# Patient Record
Sex: MALE | Race: WHITE | NOT HISPANIC OR LATINO | Employment: OTHER | URBAN - METROPOLITAN AREA
[De-identification: names, ages, dates, MRNs, and addresses within clinical notes are randomized per-mention and may not be internally consistent; named-entity substitution may affect disease eponyms.]

---

## 2024-10-23 ENCOUNTER — TELEPHONE (OUTPATIENT)
Dept: PRIMARY CARE | Facility: CLINIC | Age: 79
End: 2024-10-23
Payer: MEDICARE

## 2024-10-23 DIAGNOSIS — E78.49 OTHER HYPERLIPIDEMIA: ICD-10-CM

## 2024-10-23 DIAGNOSIS — E03.8 OTHER SPECIFIED HYPOTHYROIDISM: ICD-10-CM

## 2024-10-23 PROBLEM — E03.9 HYPOTHYROIDISM: Status: ACTIVE | Noted: 2020-02-19

## 2024-12-14 ENCOUNTER — ANCILLARY PROCEDURE (OUTPATIENT)
Dept: URGENT CARE | Age: 79
End: 2024-12-14
Payer: MEDICARE

## 2024-12-14 ENCOUNTER — OFFICE VISIT (OUTPATIENT)
Dept: URGENT CARE | Age: 79
End: 2024-12-14
Payer: MEDICARE

## 2024-12-14 VITALS
WEIGHT: 165 LBS | TEMPERATURE: 98.2 F | DIASTOLIC BLOOD PRESSURE: 71 MMHG | HEART RATE: 65 BPM | OXYGEN SATURATION: 99 % | RESPIRATION RATE: 20 BRPM | SYSTOLIC BLOOD PRESSURE: 183 MMHG | BODY MASS INDEX: 25.09 KG/M2

## 2024-12-14 DIAGNOSIS — M25.562 ACUTE PAIN OF LEFT KNEE: ICD-10-CM

## 2024-12-14 DIAGNOSIS — G57.22 ANTERIOR FEMORAL CUTANEOUS NEUROPATHY, LEFT: Primary | ICD-10-CM

## 2024-12-14 PROCEDURE — 73564 X-RAY EXAM KNEE 4 OR MORE: CPT | Mod: LEFT SIDE | Performed by: SURGERY

## 2024-12-14 NOTE — PROGRESS NOTES
Chief Complaint   Patient presents with    LEFT KNEE PAIN      1 DAY       Physical Exam L knee:     Skin: No ecchymosis erythema  Swelling: none  Deformity: None  Tenderness: none-- numbness of anterolateral knee to approx 6 cm above the knee  ROM: unlimited  Joint effusion: None    Imaging:       === 12/14/24 ===    XR KNEE LEFT 4+ VIEWS    - Impression -  Mild arthritic changes and patellar spurring.    MACRO:  None.    Signed by: Dennise Brennan 12/14/2024 9:23 AM  Dictation workstation:   [id number]    Assessment:     Encounter Diagnosis   Name Primary?    Anterior femoral cutaneous neuropathy, left Yes          Medical Decision Making & Plan:   For pain:   -Take 1000 mg of Tylenol with 800 mg ibuprofen every 6-8 hours. These work better when taken at the same time.      -lidocaine patches     -Rest, Ice (20 min on 20 min off for first 48 hours then change to heat), Compression (For comfort/to reduce swelling), Elevation (Helps reduce swelling).              12/14/24 at 10:34 AM - Virginia Dan, DO

## 2024-12-14 NOTE — PATIENT INSTRUCTIONS
For follow up, please call for appointment     [facility]      Ash Stuart, FINN Mcitnosh, DO       [address] [address]   [place] [address] [phone]

## 2025-03-18 LAB
ALBUMIN SERPL-MCNC: 4 G/DL (ref 3.6–5.1)
ALP SERPL-CCNC: 74 U/L (ref 35–144)
ALT SERPL-CCNC: 9 U/L (ref 9–46)
ANION GAP SERPL CALCULATED.4IONS-SCNC: 8 MMOL/L (CALC) (ref 7–17)
APPEARANCE UR: CLEAR
AST SERPL-CCNC: 14 U/L (ref 10–35)
BASOPHILS # BLD AUTO: 41 CELLS/UL (ref 0–200)
BASOPHILS NFR BLD AUTO: 0.4 %
BILIRUB SERPL-MCNC: 0.5 MG/DL (ref 0.2–1.2)
BILIRUB UR QL STRIP: NEGATIVE
BUN SERPL-MCNC: 13 MG/DL (ref 7–25)
CALCIUM SERPL-MCNC: 9 MG/DL (ref 8.6–10.3)
CHLORIDE SERPL-SCNC: 106 MMOL/L (ref 98–110)
CHOLEST SERPL-MCNC: 185 MG/DL
CHOLEST/HDLC SERPL: 2.8 (CALC)
CO2 SERPL-SCNC: 28 MMOL/L (ref 20–32)
COLOR UR: YELLOW
CREAT SERPL-MCNC: 1.03 MG/DL (ref 0.7–1.22)
EGFRCR SERPLBLD CKD-EPI 2021: 73 ML/MIN/1.73M2
EOSINOPHIL # BLD AUTO: 112 CELLS/UL (ref 15–500)
EOSINOPHIL NFR BLD AUTO: 1.1 %
ERYTHROCYTE [DISTWIDTH] IN BLOOD BY AUTOMATED COUNT: 11.6 % (ref 11–15)
GLUCOSE SERPL-MCNC: 97 MG/DL (ref 65–99)
GLUCOSE UR QL STRIP: NEGATIVE
HCT VFR BLD AUTO: 43.7 % (ref 38.5–50)
HDLC SERPL-MCNC: 66 MG/DL
HGB BLD-MCNC: 14.2 G/DL (ref 13.2–17.1)
HGB UR QL STRIP: NEGATIVE
KETONES UR QL STRIP: NEGATIVE
LDLC SERPL CALC-MCNC: 99 MG/DL (CALC)
LEUKOCYTE ESTERASE UR QL STRIP: NEGATIVE
LYMPHOCYTES # BLD AUTO: 3896 CELLS/UL (ref 850–3900)
LYMPHOCYTES NFR BLD AUTO: 38.2 %
MCH RBC QN AUTO: 29.6 PG (ref 27–33)
MCHC RBC AUTO-ENTMCNC: 32.5 G/DL (ref 32–36)
MCV RBC AUTO: 91.2 FL (ref 80–100)
MONOCYTES # BLD AUTO: 755 CELLS/UL (ref 200–950)
MONOCYTES NFR BLD AUTO: 7.4 %
NEUTROPHILS # BLD AUTO: 5396 CELLS/UL (ref 1500–7800)
NEUTROPHILS NFR BLD AUTO: 52.9 %
NITRITE UR QL STRIP: NEGATIVE
NONHDLC SERPL-MCNC: 119 MG/DL (CALC)
PH UR STRIP: 5.5 [PH] (ref 5–8)
PLATELET # BLD AUTO: 270 THOUSAND/UL (ref 140–400)
PMV BLD REES-ECKER: 10.6 FL (ref 7.5–12.5)
POTASSIUM SERPL-SCNC: 4.3 MMOL/L (ref 3.5–5.3)
PROT SERPL-MCNC: 6.4 G/DL (ref 6.1–8.1)
PROT UR QL STRIP: NEGATIVE
RBC # BLD AUTO: 4.79 MILLION/UL (ref 4.2–5.8)
SODIUM SERPL-SCNC: 142 MMOL/L (ref 135–146)
SP GR UR STRIP: 1.01 (ref 1–1.03)
TRIGL SERPL-MCNC: 100 MG/DL
TSH SERPL-ACNC: 4.39 MIU/L (ref 0.4–4.5)
WBC # BLD AUTO: 10.2 THOUSAND/UL (ref 3.8–10.8)

## 2025-03-22 ASSESSMENT — PROMIS GLOBAL HEALTH SCALE
CARRYOUT_PHYSICAL_ACTIVITIES: COMPLETELY
RATE_QUALITY_OF_LIFE: VERY GOOD
RATE_PHYSICAL_HEALTH: VERY GOOD
EMOTIONAL_PROBLEMS: NEVER
RATE_MENTAL_HEALTH: VERY GOOD
RATE_SOCIAL_SATISFACTION: VERY GOOD
RATE_GENERAL_HEALTH: VERY GOOD
CARRYOUT_SOCIAL_ACTIVITIES: VERY GOOD
RATE_AVERAGE_FATIGUE: MILD
RATE_AVERAGE_PAIN: 2

## 2025-03-24 ENCOUNTER — TELEPHONE (OUTPATIENT)
Dept: PRIMARY CARE | Facility: CLINIC | Age: 80
End: 2025-03-24

## 2025-03-24 ENCOUNTER — APPOINTMENT (OUTPATIENT)
Dept: PRIMARY CARE | Facility: CLINIC | Age: 80
End: 2025-03-24
Payer: MEDICARE

## 2025-03-24 VITALS
HEART RATE: 58 BPM | SYSTOLIC BLOOD PRESSURE: 132 MMHG | RESPIRATION RATE: 18 BRPM | WEIGHT: 170 LBS | BODY MASS INDEX: 27.32 KG/M2 | HEIGHT: 66 IN | DIASTOLIC BLOOD PRESSURE: 70 MMHG | OXYGEN SATURATION: 97 %

## 2025-03-24 DIAGNOSIS — Z00.00 WELL ADULT EXAM: ICD-10-CM

## 2025-03-24 DIAGNOSIS — E78.00 PURE HYPERCHOLESTEROLEMIA, UNSPECIFIED: Primary | ICD-10-CM

## 2025-03-24 DIAGNOSIS — E78.00 PURE HYPERCHOLESTEROLEMIA, UNSPECIFIED: ICD-10-CM

## 2025-03-24 DIAGNOSIS — E03.8 OTHER SPECIFIED HYPOTHYROIDISM: ICD-10-CM

## 2025-03-24 DIAGNOSIS — Z79.899 MEDICATION MANAGEMENT: ICD-10-CM

## 2025-03-24 DIAGNOSIS — M79.10 MYALGIA: ICD-10-CM

## 2025-03-24 PROBLEM — D49.2 ABNORMAL SKIN GROWTH: Status: ACTIVE | Noted: 2019-07-23

## 2025-03-24 PROBLEM — E78.49 OTHER HYPERLIPIDEMIA: Status: RESOLVED | Noted: 2020-02-14 | Resolved: 2025-03-24

## 2025-03-24 PROBLEM — U07.1 DISEASE DUE TO SEVERE ACUTE RESPIRATORY SYNDROME CORONAVIRUS 2 (SARS-COV-2): Status: RESOLVED | Noted: 2022-09-19 | Resolved: 2025-03-24

## 2025-03-24 PROBLEM — K40.90 LEFT INGUINAL HERNIA: Status: RESOLVED | Noted: 2020-02-19 | Resolved: 2025-03-24

## 2025-03-24 PROBLEM — R19.5 GUAIAC POSITIVE STOOLS: Status: ACTIVE | Noted: 2025-03-24

## 2025-03-24 PROBLEM — E03.9 HYPOTHYROIDISM: Status: RESOLVED | Noted: 2020-02-19 | Resolved: 2025-03-24

## 2025-03-24 PROBLEM — C44.41 BASAL CELL CARCINOMA OF NECK: Status: ACTIVE | Noted: 2019-07-31

## 2025-03-24 PROBLEM — D48.5 NEOPLASM OF UNCERTAIN BEHAVIOR OF SKIN: Status: RESOLVED | Noted: 2019-06-19 | Resolved: 2025-03-24

## 2025-03-24 PROBLEM — Z98.890 OTHER SPECIFIED POSTPROCEDURAL STATES: Status: RESOLVED | Noted: 2025-03-24 | Resolved: 2025-03-24

## 2025-03-24 PROBLEM — Z86.0100 HISTORY OF COLON POLYPS: Status: RESOLVED | Noted: 2025-03-24 | Resolved: 2025-03-24

## 2025-03-24 PROBLEM — L98.9 CHANGING SKIN LESION: Status: ACTIVE | Noted: 2021-02-26

## 2025-03-24 PROCEDURE — 1170F FXNL STATUS ASSESSED: CPT | Performed by: FAMILY MEDICINE

## 2025-03-24 PROCEDURE — 1158F ADVNC CARE PLAN TLK DOCD: CPT | Performed by: FAMILY MEDICINE

## 2025-03-24 PROCEDURE — 1123F ACP DISCUSS/DSCN MKR DOCD: CPT | Performed by: FAMILY MEDICINE

## 2025-03-24 PROCEDURE — 1036F TOBACCO NON-USER: CPT | Performed by: FAMILY MEDICINE

## 2025-03-24 PROCEDURE — 1125F AMNT PAIN NOTED PAIN PRSNT: CPT | Performed by: FAMILY MEDICINE

## 2025-03-24 PROCEDURE — 99397 PER PM REEVAL EST PAT 65+ YR: CPT | Performed by: FAMILY MEDICINE

## 2025-03-24 PROCEDURE — 1159F MED LIST DOCD IN RCRD: CPT | Performed by: FAMILY MEDICINE

## 2025-03-24 PROCEDURE — G0439 PPPS, SUBSEQ VISIT: HCPCS | Performed by: FAMILY MEDICINE

## 2025-03-24 ASSESSMENT — PATIENT HEALTH QUESTIONNAIRE - PHQ9
SUM OF ALL RESPONSES TO PHQ9 QUESTIONS 1 AND 2: 0
2. FEELING DOWN, DEPRESSED OR HOPELESS: NOT AT ALL
1. LITTLE INTEREST OR PLEASURE IN DOING THINGS: NOT AT ALL

## 2025-03-24 ASSESSMENT — ENCOUNTER SYMPTOMS
LOSS OF SENSATION IN FEET: 0
DEPRESSION: 0
OCCASIONAL FEELINGS OF UNSTEADINESS: 0

## 2025-03-24 ASSESSMENT — ACTIVITIES OF DAILY LIVING (ADL)
TAKING_MEDICATION: INDEPENDENT
MANAGING_FINANCES: INDEPENDENT
DOING_HOUSEWORK: INDEPENDENT
DRESSING: INDEPENDENT
GROCERY_SHOPPING: INDEPENDENT
BATHING: INDEPENDENT

## 2025-03-24 ASSESSMENT — PAIN SCALES - GENERAL: PAINLEVEL_OUTOF10: 5

## 2025-03-24 NOTE — TELEPHONE ENCOUNTER
Ft - 03/09/26    cpe   Billing Type: Third-Party Bill Expected Date Of Service: 09/17/2024 Performing Laboratory: -805 Bill For Surgical Tray: no

## 2025-03-24 NOTE — ASSESSMENT & PLAN NOTE
Symptoms consistent with PMR.  We discussed this possibility and the potential treatment.  At this point he would like to hold off on taking prednisone.  He is going continue to take Aleve on a as needed basis.  If symptoms do not improve over the next couple weeks or if they get worse before then he will let me know.  At that time would set him up for sed rate and start prednisone.

## 2025-03-24 NOTE — ASSESSMENT & PLAN NOTE
Keep off medication.  Continue low-fat diet.  Continue exercising.  Recheck in 1 year at CPE.

## 2025-03-24 NOTE — PROGRESS NOTES
"Subjective   Reason for Visit: Godwin Abbasi is an 79 y.o. male here for a Medicare Wellness visit.     Past Medical, Surgical, and Family History reviewed and updated in chart.    Reviewed all medications by prescribing practitioner or clinical pharmacist (such as prescriptions, OTCs, herbal therapies and supplements) and documented in the medical record.    HPI    Patient Care Team:  Claudio Kidd MD as PCP - General (Family Medicine)   1. GODWIN is seen for for his comprehensive physical exam. PMH, PSH, family history and social history were reviewed and updated.  He had a colonoscopy in 1/09 by Dr. Hansen which showed an adenomatous polyp. Repeat colonoscopy in 4/12 and 4/21 showed diverticulosis but no polyps. Was recommended that he repeat the scope in 2031.     2. GODWIN is seen today also for follow up of High cholesterol.  He is on no medication. Recent LDL is 99.      3. He is here for hypothyroidism.  He has a history of  elevated TSH He is on no medication.  Most recent TSH is normal.  He is asymptomatic.      4.   He is here for 2-3 history of achiness in his thighs lower back and arms.  This started several days after returning from a cruise.  He said no trauma.  No previous history of aches and pains like this.  No joint pain.  He is taking Aleve which helps the symptoms to a degree.  Symptoms are worse in the morning.  As he moves around it seems to get little better towards the end of the day.    Review of Systems  Denies headache, blurred vision, chest pain, shortness of breath, nausea or vomiting, change in bowel habits or leg pain or swelling.    Objective   Vitals:  /70 (BP Location: Left arm, Patient Position: Sitting, BP Cuff Size: Large adult)   Pulse 58   Resp 18   Ht 1.664 m (5' 5.5\")   Wt 77.1 kg (170 lb)   SpO2 97%   BMI 27.86 kg/m²       Physical Exam  General appearance: Vital signs have been reviewed.  Comfortable.  Well-nourished and well-developed.He is alert and " oriented x3 and appears his stated age.The patient is cooperative with exam.  Head: Hair pattern reveals a normal pattern for patient's age and face shows no abnormalities.  Eyes: PERRLA x2, EOMI x2, conjunctive a and sclera are clear.  Ears: External bilateral ears with normal helix, tragus and earlobe.Bilateral canals are normal.Bilateral tympanic membranes are pearly gray and landmarks are well visualized.  Nose: Nasal mucosa both nostrils reveals no polyps, ulcerations or lesions.  Throat:Teeth are in good repair.  Posterior pharynx reveals no abnormalities.  Neck: Supple without lymphadenopathy, thyromegaly or carotid bruits.  Lungs:Clear to auscultation bilaterally with no wheezes, rales or rhonchi.  Cardiovascular: RRR without MRG.No carotid bruits.  Extremities without edema and pulses are intact.  Abdomen: Soft, NT, no masses, no hepatosplenomegaly.  Genitalia: No testicular masses.  No evidence of inguinal hernia.Nontender.  Rectal: No masses.  Prostate is normal in size and shape with no nodules. It is nontender.  Musculoskeletal: 5/5 and equal strength in bilateral upper and lower extremities.  Skin: Good turgor and without rashes.  Neurological: Good overall strength and no focal deficits.  Cranial nerves II through XII are grossly intact.  Psychiatric: Patient has appropriate judgment with good insight.  Mood is appropriate.    Assessment & Plan  Pure hypercholesterolemia, unspecified  Keep off medication.  Continue low-fat diet.  Continue exercising.  Recheck in 1 year at [facility].         Well adult exam  Anticipatory guidance given.         Myalgia  Symptoms consistent with PMR.  We discussed this possibility and the potential treatment.  At this point he would like to hold off on taking prednisone.  He is going continue to take Aleve on a as needed basis.  If symptoms do not improve over the next couple weeks or if they get worse before then he will let me know.  At that time would set him up for sed  rate and start prednisone.

## 2025-04-07 ENCOUNTER — APPOINTMENT (OUTPATIENT)
Dept: PRIMARY CARE | Facility: CLINIC | Age: 80
End: 2025-04-07
Payer: MEDICARE

## 2025-04-07 ENCOUNTER — TELEPHONE (OUTPATIENT)
Dept: PRIMARY CARE | Facility: CLINIC | Age: 80
End: 2025-04-07

## 2025-04-07 DIAGNOSIS — M79.10 MYALGIA: Primary | ICD-10-CM

## 2025-04-07 DIAGNOSIS — M79.10 MYALGIA: ICD-10-CM

## 2025-04-07 RX ORDER — PREDNISONE 20 MG/1
20 TABLET ORAL DAILY
Qty: 5 TABLET | Refills: 0 | Status: SHIPPED | OUTPATIENT
Start: 2025-04-07 | End: 2025-04-10 | Stop reason: SDUPTHER

## 2025-04-07 NOTE — PROGRESS NOTES
Phone call.  Patient still has myalgias.  Per last office visit we will treat this as PMR.  He is going get a sed rate today and then will start prednisone 20 mg daily..  In 2 to 3 days he is to let me know how he is feeling.  If his symptoms are improved we will continue the prednisone if not we will stop the prednisone and pursue other potential diagnoses.  We did discuss the pros and cons of long-term prednisone.

## 2025-04-07 NOTE — TELEPHONE ENCOUNTER
Patient scheduled an appointment for 04-07-25 for Muscle pain in arms. Patient would like to know if he must see [facility] or go for labs or if [facility] would prescribe medication for Muscle pain.     Please advise    Patient can be reached at [phone]

## 2025-04-08 LAB — ERYTHROCYTE [SEDIMENTATION RATE] IN BLOOD BY WESTERGREN METHOD: 22 MM/H

## 2025-04-10 RX ORDER — PREDNISONE 20 MG/1
20 TABLET ORAL DAILY
Qty: 30 TABLET | Refills: 1 | Status: SHIPPED | OUTPATIENT
Start: 2025-04-10 | End: 2026-04-10

## 2025-04-10 NOTE — TELEPHONE ENCOUNTER
Refill for    Prednisone 20 MG    Pharmacy is [facility] on [address]    Patient would like to know if [facility] would give him enough medication until he's seen    Please advise    Patient can be reached at [phone]

## 2025-04-10 NOTE — TELEPHONE ENCOUNTER
I spoke to the patient.  He was started on prednisone 20 mg a day for probable PMR.  He states several hours after taking the first dose his symptoms improved and he is feeling back to baseline after 3 days of the prednisone.  He is scheduled to see me in about 3 to 4 weeks.  I will send in a prescription for him to continue 20 mg of prednisone daily until then.  He is to call for any concerns.

## 2025-05-12 ENCOUNTER — APPOINTMENT (OUTPATIENT)
Dept: PRIMARY CARE | Facility: CLINIC | Age: 80
End: 2025-05-12
Payer: MEDICARE

## 2025-05-12 VITALS
WEIGHT: 166 LBS | DIASTOLIC BLOOD PRESSURE: 62 MMHG | HEART RATE: 60 BPM | RESPIRATION RATE: 16 BRPM | SYSTOLIC BLOOD PRESSURE: 138 MMHG | OXYGEN SATURATION: 97 % | BODY MASS INDEX: 27.2 KG/M2

## 2025-05-12 DIAGNOSIS — M35.3 POLYMYALGIA RHEUMATICA (MULTI): Primary | ICD-10-CM

## 2025-05-12 PROBLEM — R19.5 GUAIAC POSITIVE STOOLS: Status: RESOLVED | Noted: 2025-03-24 | Resolved: 2025-05-12

## 2025-05-12 PROCEDURE — 1159F MED LIST DOCD IN RCRD: CPT | Performed by: FAMILY MEDICINE

## 2025-05-12 PROCEDURE — 99213 OFFICE O/P EST LOW 20 MIN: CPT | Performed by: FAMILY MEDICINE

## 2025-05-12 PROCEDURE — 1126F AMNT PAIN NOTED NONE PRSNT: CPT | Performed by: FAMILY MEDICINE

## 2025-05-12 PROCEDURE — 1036F TOBACCO NON-USER: CPT | Performed by: FAMILY MEDICINE

## 2025-05-12 RX ORDER — PREDNISONE 10 MG/1
20 TABLET ORAL DAILY
Qty: 60 TABLET | Refills: 2 | Status: SHIPPED | OUTPATIENT
Start: 2025-05-12 | End: 2026-05-12

## 2025-05-12 ASSESSMENT — ENCOUNTER SYMPTOMS
LOSS OF SENSATION IN FEET: 0
OCCASIONAL FEELINGS OF UNSTEADINESS: 0
DEPRESSION: 0

## 2025-05-12 ASSESSMENT — PATIENT HEALTH QUESTIONNAIRE - PHQ9
2. FEELING DOWN, DEPRESSED OR HOPELESS: NOT AT ALL
SUM OF ALL RESPONSES TO PHQ9 QUESTIONS 1 AND 2: 0
1. LITTLE INTEREST OR PLEASURE IN DOING THINGS: NOT AT ALL

## 2025-05-12 ASSESSMENT — PAIN SCALES - GENERAL: PAINLEVEL_OUTOF10: 0-NO PAIN

## 2025-05-12 NOTE — ASSESSMENT & PLAN NOTE
Will decrease prednisone to 15 mg daily for 3 weeks then to 12.5 mg daily for 3 weeks then he will call me with an update.  If he has any recurrence of symptoms he is to call sooner.  He is to schedule follow-up appoint with me in 3 months.

## 2025-05-12 NOTE — PROGRESS NOTES
Subjective   Patient ID: Joshua Abbasi is a 79 y.o. male who presents for Follow-up (Patient is here for a PMR follow up).    HPI   He is here for follow-up of PMR.  This was diagnosed at his CPE last month.  He started on prednisone 20 mg daily and symptoms improved dramatically within a few days.  Sed rate at that time was 22.  Since starting the prednisone he has been symptom-free.  Review of Systems  Denies headache, blurred vision, chest pain, shortness of breath, nausea or vomiting, change in bowel habits or leg pain or swelling.    Objective   /62 (BP Location: Left arm, Patient Position: Sitting, BP Cuff Size: Large adult)   Pulse 60   Resp 16   Wt 75.3 kg (166 lb)   SpO2 97%   BMI 27.20 kg/m²     Physical Exam  Vitals and nurse's notes reviewed  General: no acute distress  HEENT: Normal  Neck: Supple  Lungs: Clear  Cardio: RRR w/o murmur  Extremities: No edema, no calf tenderness  Neuro: Alert and oriented with no focal deficits    Assessment/Plan   Assessment & Plan  Polymyalgia rheumatica (Multi)  Will decrease prednisone to 15 mg daily for 3 weeks then to 12.5 mg daily for 3 weeks then he will call me with an update.  If he has any recurrence of symptoms he is to call sooner.  He is to schedule follow-up appoint with me in 3 months.

## 2025-08-12 ENCOUNTER — APPOINTMENT (OUTPATIENT)
Dept: PRIMARY CARE | Facility: CLINIC | Age: 80
End: 2025-08-12
Payer: MEDICARE

## 2025-08-12 VITALS
DIASTOLIC BLOOD PRESSURE: 76 MMHG | TEMPERATURE: 97.2 F | HEART RATE: 56 BPM | HEIGHT: 66 IN | OXYGEN SATURATION: 94 % | WEIGHT: 168 LBS | BODY MASS INDEX: 27 KG/M2 | SYSTOLIC BLOOD PRESSURE: 166 MMHG

## 2025-08-12 DIAGNOSIS — M35.3 POLYMYALGIA RHEUMATICA (MULTI): Primary | ICD-10-CM

## 2025-08-12 PROCEDURE — 1159F MED LIST DOCD IN RCRD: CPT | Performed by: FAMILY MEDICINE

## 2025-08-12 PROCEDURE — 99213 OFFICE O/P EST LOW 20 MIN: CPT | Performed by: FAMILY MEDICINE

## 2025-08-12 PROCEDURE — 1036F TOBACCO NON-USER: CPT | Performed by: FAMILY MEDICINE

## 2025-08-12 ASSESSMENT — COLUMBIA-SUICIDE SEVERITY RATING SCALE - C-SSRS
2. HAVE YOU ACTUALLY HAD ANY THOUGHTS OF KILLING YOURSELF?: NO
1. IN THE PAST MONTH, HAVE YOU WISHED YOU WERE DEAD OR WISHED YOU COULD GO TO SLEEP AND NOT WAKE UP?: NO
6. HAVE YOU EVER DONE ANYTHING, STARTED TO DO ANYTHING, OR PREPARED TO DO ANYTHING TO END YOUR LIFE?: NO

## 2026-03-09 ENCOUNTER — APPOINTMENT (OUTPATIENT)
Dept: PRIMARY CARE | Facility: CLINIC | Age: 81
End: 2026-03-09
Payer: MEDICARE